# Patient Record
Sex: FEMALE | Race: WHITE | NOT HISPANIC OR LATINO | Employment: FULL TIME | URBAN - METROPOLITAN AREA
[De-identification: names, ages, dates, MRNs, and addresses within clinical notes are randomized per-mention and may not be internally consistent; named-entity substitution may affect disease eponyms.]

---

## 2023-03-17 DIAGNOSIS — F32.A DEPRESSION, UNSPECIFIED DEPRESSION TYPE: ICD-10-CM

## 2023-03-17 RX ORDER — ESCITALOPRAM OXALATE 10 MG/1
1 TABLET ORAL DAILY
COMMUNITY
Start: 2018-07-02 | End: 2023-03-17 | Stop reason: SDUPTHER

## 2023-03-17 RX ORDER — ESCITALOPRAM OXALATE 10 MG/1
10 TABLET ORAL DAILY
Qty: 90 TABLET | Refills: 3 | Status: SHIPPED | OUTPATIENT
Start: 2023-03-17 | End: 2024-03-16

## 2023-04-03 LAB
ERYTHROCYTE DISTRIBUTION WIDTH (RATIO) BY AUTOMATED COUNT: 13.5 % (ref 11.5–14.5)
ERYTHROCYTE MEAN CORPUSCULAR HEMOGLOBIN CONCENTRATION (G/DL) BY AUTOMATED: 32.3 G/DL (ref 32–36)
ERYTHROCYTE MEAN CORPUSCULAR VOLUME (FL) BY AUTOMATED COUNT: 87 FL (ref 80–100)
ERYTHROCYTES (10*6/UL) IN BLOOD BY AUTOMATED COUNT: 3.86 X10E12/L (ref 4–5.2)
FERRITIN, PREGNANCY: 16 UG/L
FOLATE, SERUM, PREGNANCY: >24 NG/ML
GLUCOSE, 1 HR SCREEN, PREG: 74 MG/DL
HEMATOCRIT (%) IN BLOOD BY AUTOMATED COUNT: 33.7 % (ref 36–46)
HEMOGLOBIN (G/DL) IN BLOOD: 10.9 G/DL (ref 12–16)
IRON (UG/DL) IN SER/PLAS IN PREGNANCY: 74 UG/DL
IRON BINDING CAPACITY (UG/DL) IN PREGNANCY: 483 UG/DL
IRON SATURATION (%) IN PREGNANCY: 15 %
LEUKOCYTES (10*3/UL) IN BLOOD BY AUTOMATED COUNT: 11.5 X10E9/L (ref 4.4–11.3)
PLATELETS (10*3/UL) IN BLOOD AUTOMATED COUNT: 295 X10E9/L (ref 150–450)
REFLEX ADDED, ANEMIA PANEL: ABNORMAL
VITAMIN B12, PREGNANCY: 333 PG/ML

## 2023-06-09 LAB — GROUP B STREP SCREEN: NORMAL

## 2024-01-23 ENCOUNTER — APPOINTMENT (OUTPATIENT)
Dept: CARDIOLOGY | Facility: HOSPITAL | Age: 34
End: 2024-01-23
Payer: COMMERCIAL

## 2024-02-12 PROBLEM — E78.01 FAMILIAL HYPERCHOLESTEREMIA: Status: ACTIVE | Noted: 2024-02-12

## 2024-02-12 PROBLEM — A60.00 HERPES GENITALIA: Status: ACTIVE | Noted: 2024-02-12

## 2024-02-12 PROBLEM — K58.2 IRRITABLE BOWEL SYNDROME WITH BOTH CONSTIPATION AND DIARRHEA: Status: ACTIVE | Noted: 2024-02-12

## 2024-02-12 PROBLEM — E78.00 HYPERCHOLESTEROLEMIA: Status: ACTIVE | Noted: 2024-02-12

## 2024-02-12 PROBLEM — Q51.28 DIDELPHIC UTERUS: Status: ACTIVE | Noted: 2024-02-12

## 2024-02-12 PROBLEM — F41.9 ANXIETY: Status: ACTIVE | Noted: 2024-02-12

## 2024-02-12 PROBLEM — Z01.419 ENCOUNTER FOR GYNECOLOGICAL EXAMINATION (GENERAL) (ROUTINE) WITHOUT ABNORMAL FINDINGS: Status: ACTIVE | Noted: 2024-02-12

## 2024-02-12 RX ORDER — ATORVASTATIN CALCIUM 40 MG/1
1 TABLET, FILM COATED ORAL NIGHTLY
COMMUNITY
Start: 2018-05-06 | End: 2024-02-14 | Stop reason: ALTCHOICE

## 2024-02-12 RX ORDER — VALACYCLOVIR HYDROCHLORIDE 500 MG/1
500 TABLET, FILM COATED ORAL 2 TIMES DAILY
COMMUNITY
End: 2024-02-14 | Stop reason: ALTCHOICE

## 2024-02-12 RX ORDER — NORGESTIMATE AND ETHINYL ESTRADIOL 0.25-0.035
1 KIT ORAL DAILY
COMMUNITY
Start: 2019-05-10 | End: 2024-02-14 | Stop reason: ALTCHOICE

## 2024-02-12 NOTE — ASSESSMENT & PLAN NOTE
Thank you for your visit for well woman care.  At your visit, you had a pap smear performed. The results will be available in [employer] in two to three weeks.

## 2024-02-14 ENCOUNTER — OFFICE VISIT (OUTPATIENT)
Dept: OBSTETRICS AND GYNECOLOGY | Facility: CLINIC | Age: 34
End: 2024-02-14
Payer: COMMERCIAL

## 2024-02-14 VITALS
DIASTOLIC BLOOD PRESSURE: 66 MMHG | SYSTOLIC BLOOD PRESSURE: 104 MMHG | WEIGHT: 164 LBS | BODY MASS INDEX: 27.32 KG/M2 | HEIGHT: 65 IN

## 2024-02-14 DIAGNOSIS — Q51.28 DIDELPHIC UTERUS: Primary | ICD-10-CM

## 2024-02-14 DIAGNOSIS — Z01.419 ENCOUNTER FOR GYNECOLOGICAL EXAMINATION (GENERAL) (ROUTINE) WITHOUT ABNORMAL FINDINGS: ICD-10-CM

## 2024-02-14 DIAGNOSIS — Z12.4 CERVICAL CANCER SCREENING: ICD-10-CM

## 2024-02-14 DIAGNOSIS — R68.82 LIBIDO, DECREASED: ICD-10-CM

## 2024-02-14 PROCEDURE — 1036F TOBACCO NON-USER: CPT | Performed by: OBSTETRICS & GYNECOLOGY

## 2024-02-14 PROCEDURE — 88142 CYTOPATH C/V THIN LAYER: CPT

## 2024-02-14 PROCEDURE — 99395 PREV VISIT EST AGE 18-39: CPT | Performed by: OBSTETRICS & GYNECOLOGY

## 2024-02-14 PROCEDURE — 87624 HPV HI-RISK TYP POOLED RSLT: CPT

## 2024-02-14 NOTE — PROGRESS NOTES
"Cornelia Valdes is a 33 y.o. here for well woman visit    HPI: Has some discomfort during sex still. Sex drive is not there.  Also, decreased with lexapro. Decreased with breast feeding.    Pain with sex is like dry.  Got a lubricant, did not help, getting a little better.  General discomfort, pressure. Not having sex that often.       Going to have another in about a year or so.  Done after two.  Not having periods.    GynHx: didelphus uterus    Social History     Substance and Sexual Activity   Sexual Activity Yes    Partners: Male    Birth control/protection: Coitus interruptus     Exercise: not too much exercise  Past med hx and past surg hx reviewed and notable for: anxiety, OCD    Objective   /66   Ht 1.651 m (5' 5\")   Wt 74.4 kg (164 lb)   LMP  (LMP Unknown) Comment: none since delivery  BMI 27.29 kg/m²      General:   Alert and oriented, in no acute distress   Neck: Supple. No visible thyromegaly.    Breast/Axilla: Normal to palpation bilaterally without masses, skin changes, or nipple discharge.    Abdomen: Soft, non-tender, without masses or organomegaly   Vulva: Normal architecture without erythema, masses, or lesions.    Vagina: Normal mucosa without lesions, masses, or atrophy. No abnormal vaginal discharge.    Cervix: Two- smaller off the the left, Normal without masses, lesions, or signs of cervicitis.    Uterus: Normal mobile, non-enlarged uterus    Adnexa: Normal without masses or lesions   Pelvic Floor No POP noted. No high tone pelvic floor    Psych Normal affect. Normal mood.      Assessment and Plan:  Problem List Items Addressed This Visit          Genitourinary and Reproductive    Didelphic uterus - Primary    Encounter for gynecological examination (general) (routine) without abnormal findings    Overview     Well woman exam         Current Assessment & Plan     Thank you for your visit for well woman care.  At your visit, you had a pap smear performed. The results will be " available in [employer] in two to three weeks.           Libido, decreased    Overview     Discussed at length. Will try different lubricants.   Interested in Dr. Ramos.

## 2024-02-29 ENCOUNTER — APPOINTMENT (OUTPATIENT)
Dept: OBSTETRICS AND GYNECOLOGY | Facility: CLINIC | Age: 34
End: 2024-02-29
Payer: COMMERCIAL

## 2024-02-29 LAB
CYTOLOGY CMNT CVX/VAG CYTO-IMP: NORMAL
CYTOLOGY CMNT CVX/VAG CYTO-IMP: NORMAL
HPV HR 12 DNA GENITAL QL NAA+PROBE: NEGATIVE
HPV HR 12 DNA GENITAL QL NAA+PROBE: NEGATIVE
HPV HR GENOTYPES PNL CVX NAA+PROBE: NEGATIVE
HPV HR GENOTYPES PNL CVX NAA+PROBE: NEGATIVE
HPV16 DNA SPEC QL NAA+PROBE: NEGATIVE
HPV16 DNA SPEC QL NAA+PROBE: NEGATIVE
HPV18 DNA SPEC QL NAA+PROBE: NEGATIVE
HPV18 DNA SPEC QL NAA+PROBE: NEGATIVE
LAB AP HPV GENOTYPE QUESTION: YES
LAB AP HPV GENOTYPE QUESTION: YES
LAB AP HPV HR: NORMAL
LAB AP HPV HR: NORMAL
LABORATORY COMMENT REPORT: NORMAL
PATH REPORT.RELEVANT HX SPEC: NORMAL
PATH REPORT.RELEVANT HX SPEC: NORMAL
PATH REPORT.TOTAL CANCER: NORMAL
PATH REPORT.TOTAL CANCER: NORMAL

## 2024-04-02 ENCOUNTER — APPOINTMENT (OUTPATIENT)
Dept: INTEGRATIVE MEDICINE | Facility: CLINIC | Age: 34
End: 2024-04-02
Payer: COMMERCIAL

## 2024-07-10 ENCOUNTER — TELEPHONE (OUTPATIENT)
Dept: PRIMARY CARE | Facility: CLINIC | Age: 34
End: 2024-07-10
Payer: COMMERCIAL

## 2024-07-10 DIAGNOSIS — F32.A DEPRESSION, UNSPECIFIED DEPRESSION TYPE: ICD-10-CM

## 2024-07-11 RX ORDER — ESCITALOPRAM OXALATE 10 MG/1
10 TABLET ORAL DAILY
Qty: 30 TABLET | Refills: 0 | Status: SHIPPED | OUTPATIENT
Start: 2024-07-11 | End: 2025-07-11

## 2024-08-02 ENCOUNTER — TELEPHONE (OUTPATIENT)
Dept: PRIMARY CARE | Facility: CLINIC | Age: 34
End: 2024-08-02
Payer: COMMERCIAL

## 2024-08-02 DIAGNOSIS — F32.A DEPRESSION, UNSPECIFIED DEPRESSION TYPE: ICD-10-CM

## 2024-08-02 NOTE — TELEPHONE ENCOUNTER
Rx Refill Request Telephone Encounter  escitalopram (Lexapro) 10 mg tablet     Pharmacy [facility] appt 8/30/24

## 2024-08-05 RX ORDER — ESCITALOPRAM OXALATE 10 MG/1
10 TABLET ORAL DAILY
Qty: 90 TABLET | Refills: 0 | Status: SHIPPED | OUTPATIENT
Start: 2024-08-05 | End: 2024-08-07

## 2024-08-06 DIAGNOSIS — F32.A DEPRESSION, UNSPECIFIED DEPRESSION TYPE: ICD-10-CM

## 2024-08-07 RX ORDER — ESCITALOPRAM OXALATE 10 MG/1
10 TABLET ORAL DAILY
Qty: 30 TABLET | Refills: 0 | Status: SHIPPED | OUTPATIENT
Start: 2024-08-07 | End: 2024-08-08

## 2024-08-08 DIAGNOSIS — F32.A DEPRESSION, UNSPECIFIED DEPRESSION TYPE: ICD-10-CM

## 2024-08-08 RX ORDER — ESCITALOPRAM OXALATE 10 MG/1
10 TABLET ORAL DAILY
Qty: 30 TABLET | Refills: 0 | Status: SHIPPED | OUTPATIENT
Start: 2024-08-08 | End: 2024-08-09 | Stop reason: SDUPTHER

## 2024-08-09 DIAGNOSIS — F32.A DEPRESSION, UNSPECIFIED DEPRESSION TYPE: ICD-10-CM

## 2024-08-09 RX ORDER — ESCITALOPRAM OXALATE 10 MG/1
10 TABLET ORAL DAILY
Qty: 30 TABLET | Refills: 0 | Status: SHIPPED | OUTPATIENT
Start: 2024-08-09

## 2024-08-30 ENCOUNTER — APPOINTMENT (OUTPATIENT)
Dept: PRIMARY CARE | Facility: CLINIC | Age: 34
End: 2024-08-30
Payer: COMMERCIAL

## 2024-08-30 VITALS
DIASTOLIC BLOOD PRESSURE: 64 MMHG | BODY MASS INDEX: 28.82 KG/M2 | HEART RATE: 73 BPM | SYSTOLIC BLOOD PRESSURE: 104 MMHG | WEIGHT: 173 LBS | HEIGHT: 65 IN

## 2024-08-30 DIAGNOSIS — E78.00 HYPERCHOLESTEROLEMIA: ICD-10-CM

## 2024-08-30 DIAGNOSIS — F32.A DEPRESSION, UNSPECIFIED DEPRESSION TYPE: ICD-10-CM

## 2024-08-30 DIAGNOSIS — Z00.00 ANNUAL PHYSICAL EXAM: Primary | ICD-10-CM

## 2024-08-30 DIAGNOSIS — F41.9 ANXIETY: ICD-10-CM

## 2024-08-30 DIAGNOSIS — E55.9 VITAMIN D DEFICIENCY: ICD-10-CM

## 2024-08-30 PROBLEM — D50.0 ANEMIA DUE TO BLOOD LOSS: Status: ACTIVE | Noted: 2024-08-30

## 2024-08-30 PROBLEM — L73.1 PILI INCARNATI: Status: ACTIVE | Noted: 2024-08-30

## 2024-08-30 PROBLEM — J02.9 PHARYNGITIS: Status: RESOLVED | Noted: 2024-08-30 | Resolved: 2024-08-30

## 2024-08-30 PROBLEM — R10.9 ABDOMINAL PAIN: Status: RESOLVED | Noted: 2024-08-30 | Resolved: 2024-08-30

## 2024-08-30 PROBLEM — L03.90 CELLULITIS: Status: RESOLVED | Noted: 2024-08-30 | Resolved: 2024-08-30

## 2024-08-30 PROBLEM — M53.3 SACRAL BACK PAIN: Status: RESOLVED | Noted: 2024-08-30 | Resolved: 2024-08-30

## 2024-08-30 PROBLEM — J30.2 SEASONAL ALLERGIES: Status: ACTIVE | Noted: 2024-08-30

## 2024-08-30 PROBLEM — B37.31 CANDIDAL VULVOVAGINITIS: Status: RESOLVED | Noted: 2024-08-30 | Resolved: 2024-08-30

## 2024-08-30 PROBLEM — Z86.19 HISTORY OF SEXUALLY TRANSMITTED DISEASE: Status: RESOLVED | Noted: 2024-08-30 | Resolved: 2024-08-30

## 2024-08-30 PROBLEM — M99.09 SEGMENTAL AND SOMATIC DYSFUNCTION: Status: ACTIVE | Noted: 2024-08-30

## 2024-08-30 PROBLEM — N87.9 CERVICAL ATYPIA: Status: ACTIVE | Noted: 2024-08-30

## 2024-08-30 PROBLEM — O92.70 LACTATION PROBLEM (HHS-HCC): Status: RESOLVED | Noted: 2024-08-30 | Resolved: 2024-08-30

## 2024-08-30 PROBLEM — M79.9 DISORDER OF SOFT TISSUE: Status: ACTIVE | Noted: 2024-08-30

## 2024-08-30 PROBLEM — O03.9 SPONTANEOUS ABORTION (HHS-HCC): Status: ACTIVE | Noted: 2024-08-30

## 2024-08-30 PROBLEM — B96.89 BACTERIAL VAGINOSIS: Status: RESOLVED | Noted: 2024-08-30 | Resolved: 2024-08-30

## 2024-08-30 PROBLEM — M79.10 MUSCLE PAIN: Status: RESOLVED | Noted: 2024-08-30 | Resolved: 2024-08-30

## 2024-08-30 PROBLEM — N76.0 BACTERIAL VAGINOSIS: Status: RESOLVED | Noted: 2024-08-30 | Resolved: 2024-08-30

## 2024-08-30 PROCEDURE — 1036F TOBACCO NON-USER: CPT | Performed by: INTERNAL MEDICINE

## 2024-08-30 PROCEDURE — 3008F BODY MASS INDEX DOCD: CPT | Performed by: INTERNAL MEDICINE

## 2024-08-30 PROCEDURE — 99395 PREV VISIT EST AGE 18-39: CPT | Performed by: INTERNAL MEDICINE

## 2024-08-30 RX ORDER — FERROUS SULFATE 325(65) MG
TABLET ORAL
COMMUNITY
Start: 2023-07-06 | End: 2024-08-30 | Stop reason: ALTCHOICE

## 2024-08-30 RX ORDER — ESCITALOPRAM OXALATE 20 MG/1
20 TABLET ORAL DAILY
Qty: 90 TABLET | Refills: 1 | Status: SHIPPED | OUTPATIENT
Start: 2024-08-30

## 2024-08-30 NOTE — PROGRESS NOTES
"Subjective   Patient ID: Cornelia Valdes is a 34 y.o. female who presents for Annual Exam and Med Refill.    HPI   Cornelia is a 34-year-old  female who comes today for an annual wellness exam and lab work.  She is currently only on Lexapro 10 mg daily but would like to try a higher dose as she feels her mood could be better.  She sees a therapist on a monthly basis.  Patient has history of familial hypercholesterolemia for which she has been evaluated by cardiology and used to be on rosuvastatin which was discontinued during her pregnancy last year.  Patient is up-to-date on vaccinations as well as Pap/pelvic through gynecology.  She otherwise feels well and denies fever, chills, chest pain, shortness of breath, cough, dizziness, palpitations, syncope, abdominal pain, nausea, vomiting, diarrhea, melena, rectal bleeding, dysuria or hematuria.  She was nursing her infant son up until about 3 to 4 months ago and menses are still erratic.  Review of Systems  As per HPI.  Objective   /64 (BP Location: Right arm, Patient Position: Sitting, BP Cuff Size: Large adult)   Pulse 73   Ht 1.651 m (5' 5\")   Wt 78.5 kg (173 lb)   BMI 28.79 kg/m²     Physical Exam  General - Well developed, well appearing, young CF in no acute respiratory distress  Eyes - normal conjunctiva with no pallor or icterus, normal extraocular movements  Neck - No JVD, thyromegaly or lymphadenopathy  Lungs - no respiratory distress and lungs clear to auscultation bilaterally with no rales or wheezes  Heart - normal S1, S2 with normal heart rate, rhythm and no murmurs   Breasts, pelvic and pap - per gyn  Abdomen - gravid, nontender  Extremities - no cyanosis or pedal edema  Neuro - grossly normal neuro exam with no focal neuro deficits  Psych - normal mental status, mood and affect   Skin - no rashes or ulcers  MSK - normal gait   Assessment/Plan        1.  Annual wellness exam-routine labs will be ordered, patient is up-to-date on " vaccinations as well as Pap/pelvic through gynecology  2.  Familial hypercholesterolemia-lipids will be checked in rosuvastatin will be resumed after lab results are available  3.  Generalized anxiety, depression-Lexapro dose will be increased to 20 mg daily, patient will continue to see her therapist on a monthly basis  Follow-up in 1 year or sooner if needed.  This note was partially generated using the Dragon voice recognition system. There may be some incorrect words, spelling and punctuation errors that were not corrected prior to committing the note to the patient's medical record.

## 2024-08-31 ENCOUNTER — LAB (OUTPATIENT)
Dept: LAB | Facility: LAB | Age: 34
End: 2024-08-31
Payer: COMMERCIAL

## 2024-08-31 DIAGNOSIS — Z00.00 ANNUAL PHYSICAL EXAM: ICD-10-CM

## 2024-08-31 LAB
ALBUMIN SERPL BCP-MCNC: 4.3 G/DL (ref 3.4–5)
ALP SERPL-CCNC: 53 U/L (ref 33–110)
ALT SERPL W P-5'-P-CCNC: 12 U/L (ref 7–45)
ANION GAP SERPL CALC-SCNC: 12 MMOL/L (ref 10–20)
APPEARANCE UR: ABNORMAL
AST SERPL W P-5'-P-CCNC: 14 U/L (ref 9–39)
BACTERIA #/AREA URNS AUTO: ABNORMAL /HPF
BILIRUB SERPL-MCNC: 0.8 MG/DL (ref 0–1.2)
BILIRUB UR STRIP.AUTO-MCNC: NEGATIVE MG/DL
BUN SERPL-MCNC: 15 MG/DL (ref 6–23)
CALCIUM SERPL-MCNC: 9.1 MG/DL (ref 8.6–10.6)
CHLORIDE SERPL-SCNC: 105 MMOL/L (ref 98–107)
CHOLEST SERPL-MCNC: 318 MG/DL (ref 0–199)
CHOLESTEROL/HDL RATIO: 7.1
CO2 SERPL-SCNC: 26 MMOL/L (ref 21–32)
COLOR UR: ABNORMAL
CREAT SERPL-MCNC: 0.85 MG/DL (ref 0.5–1.05)
EGFRCR SERPLBLD CKD-EPI 2021: >90 ML/MIN/1.73M*2
ERYTHROCYTE [DISTWIDTH] IN BLOOD BY AUTOMATED COUNT: 13 % (ref 11.5–14.5)
EST. AVERAGE GLUCOSE BLD GHB EST-MCNC: 103 MG/DL
GLUCOSE SERPL-MCNC: 83 MG/DL (ref 74–99)
GLUCOSE UR STRIP.AUTO-MCNC: NORMAL MG/DL
HBA1C MFR BLD: 5.2 %
HCT VFR BLD AUTO: 36.5 % (ref 36–46)
HDLC SERPL-MCNC: 44.6 MG/DL
HGB BLD-MCNC: 11.8 G/DL (ref 12–16)
KETONES UR STRIP.AUTO-MCNC: NEGATIVE MG/DL
LDLC SERPL CALC-MCNC: 257 MG/DL
LEUKOCYTE ESTERASE UR QL STRIP.AUTO: ABNORMAL
MCH RBC QN AUTO: 28.2 PG (ref 26–34)
MCHC RBC AUTO-ENTMCNC: 32.3 G/DL (ref 32–36)
MCV RBC AUTO: 87 FL (ref 80–100)
MUCOUS THREADS #/AREA URNS AUTO: ABNORMAL /LPF
NITRITE UR QL STRIP.AUTO: NEGATIVE
NON HDL CHOLESTEROL: 273 MG/DL (ref 0–149)
NRBC BLD-RTO: 0 /100 WBCS (ref 0–0)
PH UR STRIP.AUTO: 7 [PH]
PLATELET # BLD AUTO: 321 X10*3/UL (ref 150–450)
POTASSIUM SERPL-SCNC: 4.3 MMOL/L (ref 3.5–5.3)
PROT SERPL-MCNC: 6.4 G/DL (ref 6.4–8.2)
PROT UR STRIP.AUTO-MCNC: NEGATIVE MG/DL
RBC # BLD AUTO: 4.19 X10*6/UL (ref 4–5.2)
RBC # UR STRIP.AUTO: NEGATIVE /UL
RBC #/AREA URNS AUTO: ABNORMAL /HPF
SODIUM SERPL-SCNC: 139 MMOL/L (ref 136–145)
SP GR UR STRIP.AUTO: 1.02
SQUAMOUS #/AREA URNS AUTO: ABNORMAL /HPF
TRIGL SERPL-MCNC: 83 MG/DL (ref 0–149)
TSH SERPL-ACNC: 1.85 MIU/L (ref 0.44–3.98)
UROBILINOGEN UR STRIP.AUTO-MCNC: NORMAL MG/DL
VLDL: 17 MG/DL (ref 0–40)
WBC # BLD AUTO: 4.9 X10*3/UL (ref 4.4–11.3)
WBC #/AREA URNS AUTO: ABNORMAL /HPF

## 2024-08-31 PROCEDURE — 83036 HEMOGLOBIN GLYCOSYLATED A1C: CPT

## 2024-08-31 PROCEDURE — 85027 COMPLETE CBC AUTOMATED: CPT

## 2024-08-31 PROCEDURE — 84443 ASSAY THYROID STIM HORMONE: CPT

## 2024-08-31 PROCEDURE — 80061 LIPID PANEL: CPT

## 2024-08-31 PROCEDURE — 80053 COMPREHEN METABOLIC PANEL: CPT

## 2024-08-31 PROCEDURE — 36415 COLL VENOUS BLD VENIPUNCTURE: CPT

## 2024-08-31 PROCEDURE — 81001 URINALYSIS AUTO W/SCOPE: CPT

## 2024-09-01 DIAGNOSIS — E78.01 FAMILIAL HYPERCHOLESTEREMIA: Primary | ICD-10-CM

## 2024-09-01 RX ORDER — ROSUVASTATIN CALCIUM 40 MG/1
40 TABLET, COATED ORAL DAILY
Qty: 90 TABLET | Refills: 3 | Status: SHIPPED | OUTPATIENT
Start: 2024-09-01 | End: 2025-09-01

## 2024-10-08 ENCOUNTER — OFFICE VISIT (OUTPATIENT)
Dept: CARDIOLOGY | Facility: HOSPITAL | Age: 34
End: 2024-10-08
Payer: COMMERCIAL

## 2024-10-08 VITALS
WEIGHT: 173.4 LBS | OXYGEN SATURATION: 98 % | DIASTOLIC BLOOD PRESSURE: 66 MMHG | HEART RATE: 77 BPM | BODY MASS INDEX: 27.87 KG/M2 | HEIGHT: 66 IN | SYSTOLIC BLOOD PRESSURE: 109 MMHG

## 2024-10-08 DIAGNOSIS — E78.01 FAMILIAL HYPERCHOLESTEREMIA: Primary | ICD-10-CM

## 2024-10-08 LAB
ATRIAL RATE: 77 BPM
P AXIS: 61 DEGREES
P OFFSET: 202 MS
P ONSET: 143 MS
PR INTERVAL: 154 MS
Q ONSET: 220 MS
QRS COUNT: 13 BEATS
QRS DURATION: 78 MS
QT INTERVAL: 408 MS
QTC CALCULATION(BAZETT): 461 MS
QTC FREDERICIA: 443 MS
R AXIS: 62 DEGREES
T AXIS: 41 DEGREES
T OFFSET: 424 MS
VENTRICULAR RATE: 77 BPM

## 2024-10-08 PROCEDURE — 93005 ELECTROCARDIOGRAM TRACING: CPT | Performed by: INTERNAL MEDICINE

## 2024-10-08 PROCEDURE — 99214 OFFICE O/P EST MOD 30 MIN: CPT | Performed by: INTERNAL MEDICINE

## 2024-10-08 PROCEDURE — 1036F TOBACCO NON-USER: CPT | Performed by: INTERNAL MEDICINE

## 2024-10-08 PROCEDURE — 3008F BODY MASS INDEX DOCD: CPT | Performed by: INTERNAL MEDICINE

## 2024-10-08 NOTE — PROGRESS NOTES
Primary Care Physician: Melissa Corrigan MD  Date of Visit: 10/08/2024  2:00 PM EDT  Location of visit: [facility]     Chief Complaint:   Chief Complaint   Patient presents with    Follow-up        HPI / Summary:   Cornelia Valdes is a 34 y.o. female presents for followup.  We last saw her in .  She has no cardiac complaints.  She is physically active and exercises 4 days a week using a [employer] without chest pain or shortness of breath.  The patient denies chest pain, shortness of breath, palpitations, lightheadedness, syncope, orthopnea, paroxysmal nocturnal dyspnea, lower extremity edema, or bleeding problems.    She stopped taking atorvastatin in 2022 because of pregnancy.  She restarted taking a statin last month with rosuvastatin.  She has no side effects.          Past Medical History:   Diagnosis Date    Abdominal pain 2024    Bacterial vaginosis 2024    Candidal vulvovaginitis 2024    Cellulitis 2024    Encounter for gynecological examination (general) (routine) without abnormal findings 2021    Well woman exam    [facility] 2024    Muscle pain 2024    Other seasonal allergic rhinitis     Seasonal allergies    Personal history of other diseases of the digestive system     History of gastrointestinal disorder    Pharyngitis 2024    Pure hypercholesterolemia, unspecified 2021    Hypercholesterolemia        Past Surgical History:   Procedure Laterality Date     SECTION, LOW TRANSVERSE  7/3/2023    Uterine Surgery    VAGINAL SEPTUM RESECTION  2007    Partial Hymenectomy          Social History:   reports that she has never smoked. She has never used smokeless tobacco. She reports that she does not currently use alcohol. She reports that she does not use drugs.     Family History:  family history is not on file.      Allergies:  Allergies   Allergen Reactions    Amoxicillin Rash and Unknown       Outpatient  "Medications:  Current Outpatient Medications   Medication Instructions    escitalopram (LEXAPRO) 20 mg, oral, Daily    rosuvastatin (CRESTOR) 40 mg, oral, Daily       Physical Exam:  Vitals:    10/08/24 1350   BP: 109/66   BP Location: Right arm   Pulse: 77   SpO2: 98%   Weight: 78.7 kg (173 lb 6.4 oz)   Height: 1.676 m (5' 6\")     Wt Readings from Last 5 Encounters:   10/08/24 78.7 kg (173 lb 6.4 oz)   08/30/24 78.5 kg (173 lb)   02/14/24 74.4 kg (164 lb)   08/24/23 79.4 kg (175 lb)   07/19/23 83 kg (183 lb)     Body mass index is 27.99 kg/m².   General: Well-developed well-nourished in no acute distress  HEENT: Normocephalic atraumatic  Neck: Supple, JVP is normal negative hepatojugular reflux 2+ carotid pulses without bruit  Pulmonary: Normal respiratory effort, clear to auscultation  Cardiovascular: No right ventricular heave, normal S1 and S2, 1 out of 6 early peaking systolic ejection no rubs or gallops  Abdomen: Soft nontender nondistended  Extremities: Warm without edema 2+ radial pulses bilaterally   Neurologic: Alert and oriented x3  Psychiatric: Normal mood and affect     Last Labs:  CMP:  Recent Labs     08/31/24 0817 07/04/23  0443 07/04/23  0021    137 137   K 4.3 4.5 4.8    105 106   CO2 26 23 21   ANIONGAP 12 14 15   BUN 15 11 11   CREATININE 0.85 0.69 0.65   EGFR >90  --   --    GLUCOSE 83 89 101*     Recent Labs     08/31/24  0817 07/04/23  0443 11/14/22  0910   ALBUMIN 4.3 2.9* 4.5   ALKPHOS 53 148* 48   ALT 12 14 11   AST 14 25 15   BILITOT 0.8 0.3 0.7     CBC:  Recent Labs     08/31/24  0817 07/04/23  0443 07/03/23  1937   WBC 4.9 18.2* 20.6*   HGB 11.8* 8.9* 9.6*   HCT 36.5 27.6* 29.7*    238 208   MCV 87 85 85     COAG:   Recent Labs     07/03/23  1937   INR 0.9     HEME/ENDO:  Recent Labs     08/31/24  0817 07/05/22  1141 09/24/20  1042   TSH 1.85 1.29 1.75   HGBA1C 5.2  --   --       CARDIAC: No results for input(s): \"LDH\", \"CKMB\", \"TROPHS\", \"BNP\" in the last 46441 " "hours.    No lab exists for component: \"CK\", \"CKMBP\"  Recent Labs     08/31/24  0817 01/26/22  0958 09/24/20  1042 12/18/18  0850   CHOL 318* 161 239* 204*   LDLF  --  92 164* 133*   LDLCALC 257*  --   --   --    HDL 44.6 53.8 62.8 55.5   TRIG 83 76 60 76       Last Cardiology Tests:  ECG:  An electrocardiogram performed today that I reviewed shows normal sinus rhythm possible left atrial abnormality.    Echo:  Echo Results:  No results found for this or any previous visit from the past 3650 days.       Cath:      Stress Test:  Stress Results:  No results found for this or any previous visit from the past 365 days.         Cardiac Imaging:        Assessment/Plan   Diagnoses and all orders for this visit:  Familial hypercholesteremia  -     ECG 12 lead (Clinic Performed)  -     Lipid panel; Future  -     Referral to Cardiology Prevention Program; Future    In summary Ms. Valdes is a pleasant 34 year-old white female with a past medical history significant for familial hyperlipidemia.  She is asymptomatic from a cardiac perspective.  Her most recent lipid profile was not at goal but she was off of therapy.  We will repeat a lipid profile in 1 month.  She should continue her current cardiovascular medications.  I did educate her with regards to using birth control while taking a statin.  I placed a referral to preventive cardiology.  We will see her back in follow-up in 1 year.      Orders:  Orders Placed This Encounter   Procedures    Lipid panel    Referral to Cardiology Prevention Program    ECG 12 lead (Clinic Performed)      Followup Appts:  Future Appointments   Date Time Provider Department Center   10/8/2025 10:00 AM Joanne Fagan, APRN-CNP [id number]           ____________________________________________________________  Phuc Cagle MD  [facility]  [facility]  "

## 2024-10-08 NOTE — PATIENT INSTRUCTIONS
Check fasting lipid profile in 1 month.  Stop taking the statin if you become pregnant.  Consult Dr. Tobin.  Follow-up in 1 year.

## 2024-11-03 DIAGNOSIS — F32.A DEPRESSION, UNSPECIFIED DEPRESSION TYPE: ICD-10-CM

## 2024-11-04 DIAGNOSIS — F32.A DEPRESSION, UNSPECIFIED DEPRESSION TYPE: ICD-10-CM

## 2024-11-04 DIAGNOSIS — F41.9 ANXIETY: ICD-10-CM

## 2024-11-04 RX ORDER — ESCITALOPRAM OXALATE 10 MG/1
10 TABLET ORAL DAILY
Qty: 90 TABLET | Refills: 1 | OUTPATIENT
Start: 2024-11-04

## 2024-11-04 RX ORDER — ESCITALOPRAM OXALATE 20 MG/1
20 TABLET ORAL DAILY
Qty: 90 TABLET | Refills: 1 | Status: SHIPPED | OUTPATIENT
Start: 2024-11-04

## 2025-01-16 ENCOUNTER — OFFICE VISIT (OUTPATIENT)
Dept: URGENT CARE | Age: 35
End: 2025-01-16
Payer: COMMERCIAL

## 2025-01-16 VITALS
WEIGHT: 170 LBS | TEMPERATURE: 98.8 F | DIASTOLIC BLOOD PRESSURE: 70 MMHG | BODY MASS INDEX: 27.32 KG/M2 | RESPIRATION RATE: 20 BRPM | OXYGEN SATURATION: 98 % | SYSTOLIC BLOOD PRESSURE: 129 MMHG | HEART RATE: 87 BPM | HEIGHT: 66 IN

## 2025-01-16 DIAGNOSIS — R69 SICK: ICD-10-CM

## 2025-01-16 DIAGNOSIS — J02.9 SORE THROAT: ICD-10-CM

## 2025-01-16 DIAGNOSIS — J06.9 VIRAL UPPER RESPIRATORY TRACT INFECTION: Primary | ICD-10-CM

## 2025-01-16 LAB
POC RAPID INFLUENZA A: NEGATIVE
POC RAPID INFLUENZA B: NEGATIVE
POC RAPID STREP: NEGATIVE
POC SARS-COV-2 AG BINAX: NORMAL

## 2025-01-16 PROCEDURE — 3008F BODY MASS INDEX DOCD: CPT | Performed by: PHYSICIAN ASSISTANT

## 2025-01-16 PROCEDURE — 99203 OFFICE O/P NEW LOW 30 MIN: CPT | Performed by: PHYSICIAN ASSISTANT

## 2025-01-16 PROCEDURE — 87804 INFLUENZA ASSAY W/OPTIC: CPT | Performed by: PHYSICIAN ASSISTANT

## 2025-01-16 PROCEDURE — 1036F TOBACCO NON-USER: CPT | Performed by: PHYSICIAN ASSISTANT

## 2025-01-16 PROCEDURE — 87880 STREP A ASSAY W/OPTIC: CPT | Performed by: PHYSICIAN ASSISTANT

## 2025-01-16 PROCEDURE — 87811 SARS-COV-2 COVID19 W/OPTIC: CPT | Performed by: PHYSICIAN ASSISTANT

## 2025-01-16 NOTE — PROGRESS NOTES
Subjective   Patient ID: Cornelia Valdes is a 34 y.o. female. They present today with a chief complaint of Fever (99.7 since yesterday- some relief with OTC), Sore Throat (Since yesterday), and Generalized Body Aches (Since yesterday).    CC: Viral symptoms x 2 to 3 days    HPI: Patient presenting for viral symptoms.  Symptoms include fever, sore throat, runny nose, congestion, and cough.      No chest pain, shortness of breath, abdominal pain, nausea, vomiting.  No trismus or drooling.  No difficulty swallowing.  No abdominal pain, nausea or vomiting.  No history of clots or clotting disorders.  No lower extremity swelling or pain.    Past Medical History  Allergies as of 2025 - Reviewed 2025   Allergen Reaction Noted    Amoxicillin Rash and Unknown 2019       (Not in a hospital admission)       Past Medical History:   Diagnosis Date    Abdominal pain 2024    Bacterial vaginosis 2024    Candidal vulvovaginitis 2024    Cellulitis 2024    Encounter for gynecological examination (general) (routine) without abnormal findings 2021    Well woman exam    Lactation problem ([facility]) 2024    Muscle pain 2024    Other seasonal allergic rhinitis     Seasonal allergies    Personal history of other diseases of the digestive system     History of gastrointestinal disorder    Pharyngitis 2024    Pure hypercholesterolemia, unspecified 2021    Hypercholesterolemia       Past Surgical History:   Procedure Laterality Date     SECTION, LOW TRANSVERSE  7/3/2023    Uterine Surgery    VAGINAL SEPTUM RESECTION  2007    Partial Hymenectomy        reports that she has never smoked. She has never used smokeless tobacco. She reports that she does not currently use alcohol. She reports that she does not use drugs.    Review of Systems  Review of Systems    After reviewing all body systems I have documented pertinent findings above in the history.  All other  "Systems reviewed and are negative for complaint.  Pertinent positive and negatives are listed in the above HPI.    Objective    Vitals:    01/16/25 1523 01/16/25 1602   BP: 129/70    Pulse: (!) 117 87   Resp: 20    Temp: 37.1 °C (98.8 °F)    TempSrc: Oral    SpO2: 95% 98%   Weight: 77.1 kg (170 lb)    Height: 1.676 m (5' 6\")      Patient's last menstrual period was 12/30/2024 (approximate).  Physical Exam    General: Alert, oriented, and cooperative.  No acute distress. Well developed, well nourished.     Skin: Skin is warm, and dry. No rashes or lesions.    Eyes: Sclera and conjunctivae normal     Ears: TM´s are intact, grey, with mild effusions bilaterally.  No significant erythema.  No hemotympanum or rupture. Bilateral auricle, helix, and tragus without inflammation or erythema.  No mastoid erythema, or tenderness.  No deformities. Right and left canal negative for erythema, or discharge.  Hearing is grossly intact bilaterally    Mouth/Throat: Pharynx is erythematous.  Tonsils are equal in size.  No exudates.  No angioedema of the lips or tongue.  No respiratory compromise, tripoding, drooling, muffled voice,  stridor, or trismus.     Neck: Supple.     Cardiac: Regular rate     Respiratory:  No acute respiratory distress.  Regular rate of breathing.  No accessory muscle use.  No tripoding.  Lungs are clear bilaterally.    Abdomen: Soft, nontender.    Musculoskeletal: Upper and lower extremities are atraumatic in appearance without deformity, erythema, edema, and atrophy. No crepitus, or tenderness. Compartments are all soft.      Procedures    Point of Care Test & Imaging Results from this visit  Results for orders placed or performed in visit on 01/16/25   POCT rapid strep A manually resulted    Collection Time: 01/16/25  3:40 PM   Result Value Ref Range    POC Rapid Strep Negative Negative   POCT Influenza A/B manually resulted    Collection Time: 01/16/25  3:41 PM   Result Value Ref Range    POC Rapid " Influenza A Negative Negative    POC Rapid Influenza B Negative Negative   POCT Covid-19 Rapid Antigen    Collection Time: 01/16/25  3:41 PM   Result Value Ref Range    POC EYAL-COV-2 AG  Presumptive negative test for SARS-CoV-2 (no antigen detected)     Presumptive negative test for SARS-CoV-2 (no antigen detected)     No results found.    Diagnostic study results (if any) were reviewed by Davide Garcia PA-C.    Assessment/Plan   Allergies, medications, history, and pertinent labs/EKGs/Imaging reviewed by Davide Garcia PA-C.     MDM:  Patient presenting for URI/viral type symptoms x 2 to 3 days. Patient does not appear toxic. No acute distress or respiratory compromise.  No tripoding. No nasal flaring.  Speaks in complete sentences.  No sinus tenderness, oral lesions, drooling, stridor, or angioedema. Neck is supple without meningeal signs. Lungs clear.  No reported chest tightness or purulent sputum production.  No clinical signs or history to suggest meningitis, Lázaro´s, peritonsillar abscess, epiglottitis, pneumonia, or asthma.  Given symptom onset/length of illness, a viral etiology is considered the most likely cause. Through shared decision making antibiotics are not warranted, and were not prescribed. Advised over the counter medication with good follow up. Pt discharged home d/t good condition.  Pt/family instructed to return if symptoms worsen or if new symptoms develop. Patient/family expressed understanding and consented to the above plan. No barriers of communication were apparent.      Orders and Diagnoses  Diagnoses and all orders for this visit:  Viral upper respiratory tract infection  Sick  -     POCT Influenza A/B manually resulted  -     POCT Covid-19 Rapid Antigen  Sore throat  -     POCT rapid strep A manually resulted      Medical Admin Record      Patient disposition: Home    Electronically signed by Davide Garcia PA-C  4:04 PM

## 2025-01-16 NOTE — PATIENT INSTRUCTIONS
You were seen for cold like symptoms.  You most likely have a viral upper respiratory tract infection.  Antibiotics are not needed at this time.    I recommend supportive therapy with the following medications below.    URI  1.  Please take Tylenol every 6 hours as needed for fever.  Alternate with ibuprofen every 6 hours.  2.  Use Tea and honey for cough. This is known to work better than most OTC medications.  3.  You can use Mucinex to break up congestion.  If prescribed use Pseudoephedrine-bromphen for cough. -This is an antihistamine, cough suppressant, and decongestant. It can help relieve cough, runny nose, stuffy nose, sneezing, and itchy or watery eyes.  4.  Stay well-hydrated and drink lots of fluids.  5.  Follow up with your primary care physician.  6.  Return to ED if symptoms worsen or new symptoms develop.    Based on clinical exam findings and history you most likely have a upper respiratory tract infection.  You are contagious as soon as the symptoms start.  Your symptoms may persist up to 2-3 weeks.  Symptoms usually peak by days 3 or 5.  The virus can be shed by hand-to-hand contact, nose and eye contact. Antibiotics are not necessary and do not help treat viral respiratory tract infections. Common respiratory tract infections include the common cold. Typical symptoms include nasal congestion, a runny nose, scratchy throat, cough, and irritability. The diagnosis is based on symptoms. Good hand hygiene is the best way to prevent these infections, and routine vaccination can help prevent influenza. Treatment aims to relieve symptoms. Rest and fluids. Tylenol and/or ibuprofen for fever and pain. Over the counter decongestants or mucolytics. Antibiotics are not necessary and do not help treat viral respiratory tract infections.

## 2025-05-27 ENCOUNTER — APPOINTMENT (OUTPATIENT)
Dept: OBSTETRICS AND GYNECOLOGY | Facility: CLINIC | Age: 35
End: 2025-05-27
Payer: COMMERCIAL

## 2025-05-27 VITALS
WEIGHT: 175 LBS | HEIGHT: 66 IN | SYSTOLIC BLOOD PRESSURE: 118 MMHG | BODY MASS INDEX: 28.12 KG/M2 | DIASTOLIC BLOOD PRESSURE: 62 MMHG

## 2025-05-27 DIAGNOSIS — Z01.419 WELL WOMAN EXAM WITH ROUTINE GYNECOLOGICAL EXAM: Primary | ICD-10-CM

## 2025-05-27 PROCEDURE — 99395 PREV VISIT EST AGE 18-39: CPT

## 2025-05-27 PROCEDURE — 3008F BODY MASS INDEX DOCD: CPT

## 2025-05-27 PROCEDURE — 1036F TOBACCO NON-USER: CPT

## 2025-05-27 ASSESSMENT — PAIN SCALES - GENERAL: PAINLEVEL_OUTOF10: 0-NO PAIN

## 2025-05-27 NOTE — PROGRESS NOTES
"Assessment/Plan     35 y.o. presents for well woman exam.     Cervical Cancer Screening  - PAP up to date  - Reviewed ASCCP guidelines with pt; next PAP due 2029    STD Screening  - Declines    Contraceptive Plan  - Open to pregnancy, going to start trying soon  - plans for rCS  - encouraged daily PNV    Health Maintenance  - SBE reviewed and encouraged monthly  - diet and exercise reviewed; recommended 150min exercise per week or 30min/day x5 days  - Routine follow up with PCP for health maintenance examination encouraged    F/U in 1 year or as needed.    DARLENE Rowe-ANGI    Subjective     Cornelia DOMINGUEZ Keisha is a 35 y.o. female presenting for a well woman exam. No concerns today.   Thinking about getting pregnant again soon.     PMH, PSH, allergies, and current medications reviewed - see chart. 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   Family Hx reviewed. Pt reports no family hx of gynecologic or breast cancer in first degree family members.   OB Hx:      Social Hx:  - relationship:   - sexually active: yes  - employment: fundraising, starting a new job next week  - diet:  balanced  - exercise: yes  - denies tobacco or illicit drug use. reports occasional alcohol use.     Sexual Concerns: denies   PAP Hx: Last PAP 24 nml  STI Hx: HSV, rare outbreaks, denies need for Rx  Contraception: none  Menstrual Periods: Patient's last menstrual period was 2025. Periods are regular every 28-30 days, lasting 5 days.  HPV Vaccination Status: vaccinated    Objective     /62   Ht 1.676 m (5' 6\")   Wt 79.4 kg (175 lb)   LMP 2025   BMI 28.25 kg/m²     Physical Exam  Vitals reviewed.   Constitutional:       General: She is not in acute distress.     Appearance: Normal appearance.   HENT:      Head: Normocephalic and atraumatic.      Mouth/Throat:      Palate: No mass.   Neck:      Thyroid: No thyroid mass, thyromegaly or thyroid tenderness.   Cardiovascular: "      Rate and Rhythm: Normal rate and regular rhythm.      Heart sounds: Normal heart sounds, S1 normal and S2 normal.   Pulmonary:      Effort: Pulmonary effort is normal.      Breath sounds: Normal breath sounds.   Chest:   Breasts:     Right: Normal. No mass, nipple discharge, skin change or tenderness.      Left: Normal. No mass, nipple discharge, skin change or tenderness.   Abdominal:      General: Abdomen is flat.      Palpations: Abdomen is soft.      Tenderness: There is no abdominal tenderness.   Genitourinary:     General: Normal vulva.      Exam position: Lithotomy position.      Pubic Area: No rash.       Labia:         Right: No rash or lesion.         Left: No rash or lesion.       Urethra: No prolapse, urethral swelling or urethral lesion.      Vagina: Normal.      Cervix: No cervical motion tenderness, discharge or lesion.      Uterus: Not enlarged and not tender.       Adnexa:         Right: No mass or tenderness.          Left: No mass or tenderness.        Comments: TWO CERVIXES VISUALIZED. One more posterior and to pt right, one more anterior and to pt left.   Musculoskeletal:         General: Normal range of motion.      Cervical back: Normal range of motion and neck supple.   Skin:     General: Skin is warm and dry.   Neurological:      Mental Status: She is alert and oriented to person, place, and time.   Psychiatric:         Mood and Affect: Mood normal.         Behavior: Behavior normal.         Thought Content: Thought content normal.         Judgment: Judgment normal.

## 2025-06-01 DIAGNOSIS — F32.A DEPRESSION, UNSPECIFIED DEPRESSION TYPE: ICD-10-CM

## 2025-06-01 DIAGNOSIS — F41.9 ANXIETY: ICD-10-CM

## 2025-06-02 RX ORDER — ESCITALOPRAM OXALATE 20 MG/1
20 TABLET ORAL DAILY
Qty: 90 TABLET | Refills: 0 | Status: SHIPPED | OUTPATIENT
Start: 2025-06-02